# Patient Record
Sex: FEMALE | Race: WHITE | URBAN - METROPOLITAN AREA
[De-identification: names, ages, dates, MRNs, and addresses within clinical notes are randomized per-mention and may not be internally consistent; named-entity substitution may affect disease eponyms.]

---

## 2017-03-13 DIAGNOSIS — J02.9 SORE THROAT: ICD-10-CM

## 2017-03-15 LAB — THROAT CULTURE: NORMAL

## 2018-11-20 ENCOUNTER — OFFICE VISIT (OUTPATIENT)
Dept: PRIMARY CARE CLINIC | Age: 23
End: 2018-11-20
Payer: COMMERCIAL

## 2018-11-20 VITALS
HEART RATE: 109 BPM | RESPIRATION RATE: 14 BRPM | HEIGHT: 61 IN | SYSTOLIC BLOOD PRESSURE: 126 MMHG | BODY MASS INDEX: 22.83 KG/M2 | OXYGEN SATURATION: 98 % | DIASTOLIC BLOOD PRESSURE: 82 MMHG | TEMPERATURE: 98.9 F | WEIGHT: 120.9 LBS

## 2018-11-20 DIAGNOSIS — J03.90 TONSILLITIS WITH EXUDATE: Primary | ICD-10-CM

## 2018-11-20 DIAGNOSIS — J02.9 SORE THROAT: ICD-10-CM

## 2018-11-20 DIAGNOSIS — J03.90 TONSILLITIS WITH EXUDATE: ICD-10-CM

## 2018-11-20 LAB
HETEROPHILE ANTIBODIES: NEGATIVE
S PYO AG THROAT QL: NORMAL

## 2018-11-20 PROCEDURE — 87880 STREP A ASSAY W/OPTIC: CPT | Performed by: NURSE PRACTITIONER

## 2018-11-20 PROCEDURE — 99213 OFFICE O/P EST LOW 20 MIN: CPT | Performed by: NURSE PRACTITIONER

## 2018-11-20 PROCEDURE — 86308 HETEROPHILE ANTIBODY SCREEN: CPT | Performed by: NURSE PRACTITIONER

## 2018-11-20 RX ORDER — AZITHROMYCIN 250 MG/1
TABLET, FILM COATED ORAL
Qty: 1 PACKET | Refills: 0 | Status: SHIPPED | OUTPATIENT
Start: 2018-11-20

## 2018-11-20 ASSESSMENT — ENCOUNTER SYMPTOMS
SWOLLEN GLANDS: 1
SORE THROAT: 1
WHEEZING: 0
SHORTNESS OF BREATH: 0
COUGH: 0
NAUSEA: 0
VOMITING: 0
SINUS PAIN: 0
SINUS PRESSURE: 0
ABDOMINAL PAIN: 0

## 2018-11-20 ASSESSMENT — PATIENT HEALTH QUESTIONNAIRE - PHQ9
SUM OF ALL RESPONSES TO PHQ9 QUESTIONS 1 & 2: 0
2. FEELING DOWN, DEPRESSED OR HOPELESS: 0
SUM OF ALL RESPONSES TO PHQ QUESTIONS 1-9: 0
SUM OF ALL RESPONSES TO PHQ QUESTIONS 1-9: 0
1. LITTLE INTEREST OR PLEASURE IN DOING THINGS: 0

## 2018-11-20 NOTE — PROGRESS NOTES
Subjective:      Patient ID: Vinh Gonzalez is a 23 y.o. female who presents today for:  Chief Complaint   Patient presents with    Pharyngitis     Pt is here for sore throat, bilateral ear pain, fever 101 deg F, chills, sweats, fatigue x 1 day. She went to [address] [place] [address] 1 month ago adn dx with strep and given amoxicillin. The left side of throat was hurting then and seemed to resolve but now rght side is hurting and white patch.  Lower Back Pain     She had UTI on 11/4/18 and given Bactrim. No other symptoms from the UTI have returned. Pharyngitis   This is a new problem. The current episode started yesterday. The problem occurs constantly. The problem has been unchanged. Associated symptoms include chills, fatigue, a fever (last night), myalgias, a sore throat and swollen glands. Pertinent negatives include no abdominal pain, congestion, coughing, headaches, nausea, rash or vomiting. Treatments tried: nyquil, dayquil. The treatment provided mild relief. No past medical history on file. Past Surgical History:   Procedure Laterality Date    MOUTH SURGERY  2015    wisdom teeth     Family History   Problem Relation Age of Onset    Diabetes Maternal Grandfather     Heart Disease Paternal Grandmother     Heart Disease Paternal Grandfather     Diabetes Maternal Grandmother      Social History     Social History    Marital status: Single     Spouse name: N/A    Number of children: N/A    Years of education: N/A     Occupational History    Not on file.      Social History Main Topics    Smoking status: Never Smoker    Smokeless tobacco: Never Used    Alcohol use No    Drug use: No    Sexual activity: Yes     Partners: Male     Birth control/ protection: Pill     Other Topics Concern    Not on file     Social History Narrative    No narrative on file     Current Outpatient Prescriptions on File Prior to Visit   Medication Sig Dispense Refill    Norethin Ace-Eth Estrad-FE ([id number]

## 2018-11-23 LAB — THROAT CULTURE: NORMAL

## 2019-03-05 ENCOUNTER — OFFICE VISIT (OUTPATIENT)
Dept: PRIMARY CARE CLINIC | Age: 24
End: 2019-03-05
Payer: COMMERCIAL

## 2019-03-05 VITALS
RESPIRATION RATE: 16 BRPM | WEIGHT: 125 LBS | TEMPERATURE: 98 F | SYSTOLIC BLOOD PRESSURE: 120 MMHG | DIASTOLIC BLOOD PRESSURE: 76 MMHG | OXYGEN SATURATION: 98 % | HEIGHT: 61 IN | HEART RATE: 77 BPM | BODY MASS INDEX: 23.6 KG/M2

## 2019-03-05 DIAGNOSIS — J01.00 ACUTE NON-RECURRENT MAXILLARY SINUSITIS: Primary | ICD-10-CM

## 2019-03-05 PROCEDURE — 99213 OFFICE O/P EST LOW 20 MIN: CPT | Performed by: PHYSICIAN ASSISTANT

## 2019-03-05 RX ORDER — CEFDINIR 300 MG/1
300 CAPSULE ORAL 2 TIMES DAILY
Qty: 20 CAPSULE | Refills: 0 | Status: SHIPPED | OUTPATIENT
Start: 2019-03-05 | End: 2019-03-15

## 2019-03-05 ASSESSMENT — ENCOUNTER SYMPTOMS
SORE THROAT: 0
SHORTNESS OF BREATH: 0
COUGH: 1
SWOLLEN GLANDS: 0
SINUS PRESSURE: 1
HOARSE VOICE: 0

## 2023-06-14 ENCOUNTER — HOSPITAL ENCOUNTER (OUTPATIENT)
Dept: DATA CONVERSION | Facility: HOSPITAL | Age: 28
End: 2023-06-14
Attending: ORTHOPAEDIC SURGERY | Admitting: ORTHOPAEDIC SURGERY
Payer: COMMERCIAL

## 2023-06-14 DIAGNOSIS — S92.422A DISPLACED FRACTURE OF DISTAL PHALANX OF LEFT GREAT TOE, INITIAL ENCOUNTER FOR CLOSED FRACTURE: ICD-10-CM

## 2023-06-14 DIAGNOSIS — S92.412A DISPLACED FRACTURE OF PROXIMAL PHALANX OF LEFT GREAT TOE, INITIAL ENCOUNTER FOR CLOSED FRACTURE: ICD-10-CM

## 2023-09-07 VITALS — HEIGHT: 61 IN | BODY MASS INDEX: 23.64 KG/M2 | WEIGHT: 125.22 LBS

## 2023-10-02 NOTE — OP NOTE
Post Operative Note:     Post-Procedure Diagnosis: Left great toe distal phalanx  fracture   Procedure: 1.  Closed reduction percutaneous pin  fixation left great toe distal phalanx fracture  2.   3.   4.   5.   Surgeon: Kaveh Klein MD   Resident/Fellow/Other Assistant: Brody VALENCIA   Estimated Blood Loss (mL): Minimal   Specimen: no   Findings: Displaced fracture     Operative Report Dictated:  Dictation: not applicable - note contains Operative  Report   Note Recipients: Brown Elder MD - [phone]  []   Operative Report:    Indications: 27-year-old female injured her left great toe resulting in displaced distal phalanx fracture and recommended for close reduction percutaneous pin fixation  versus ORIF    Risks benefits and alternatives to surgery were discussed including but not limited to Infection, bleeding, neurovascular injury, pain and dysfunction, hardware related complications including cutout failure breakage, loss of function, motion, and permanent  disability as well as the cardiovascular and pulmonary complications from anesthesia including death and DVT. Patient and family accept these risks.  We discussed specificallyHardware complication going cut out, failure, breakage, stiffness, loss in strength, function or motion, potential wound healing complications including dehiscence, infection, failure, pin tract infection, pin breakage, possibility  of ORIF and the potential need for future revision surgeries    Patient identified in the preop area.  Left lower extremity marked and confirmed with patient as the operative site.  Brought back to the operating room and anesthetized under general anesthesia.  Left lower extremity was then prepped and draped in standard sterile fashion.  Patient, site and procedure were confirmed with timeout.  Everyone in the room agreed.  Preop antibiotics were given.  Placed longitudinal traction and reduction maneuvers to her distal phalanx fracture.  Patient had  transverse displaced significant fracture pattern with gapping and angular deformity.  Placed the 2 distal 0.62 inch K wires to hold the distal fragment.   Then underwent reduction maneuvers and closed the fracture gap with rotational angular deformity.  K wires were advanced in AP and lateral all planes showed significant improvement in reduction.  K wires were advanced.  The fracture remained stable in  the sagittal and coronal plane.  Pins were bent and clipped in standard fashion.  Xeroform and sterile dressings were applied.  Patient placed in well-padded splint and sent to the PACU stable condition.    Physicians assistant/surgical assistant was present throughout the entire case. Given the nature of the disease process and the procedure, a skilled surgical first assistant was necessary during the case. The assistant was necessary to hold retractors  and to manipulate the extremity during the procedure. A certified scrub tech was at the back table managing the instruments and supplies for the surgical case.       Electronic Signatures:  Kaveh Klein)  (Signed 14-Jun-2023 22:56)   Authored: Post Operative Note, Note Completion      Last Updated: 14-Jun-2023 22:56 by Kaveh Klein)

## 2023-10-16 ENCOUNTER — APPOINTMENT (OUTPATIENT)
Dept: ORTHOPEDIC SURGERY | Facility: CLINIC | Age: 28
End: 2023-10-16
Payer: COMMERCIAL